# Patient Record
Sex: FEMALE | Race: WHITE | Employment: PART TIME | URBAN - METROPOLITAN AREA
[De-identification: names, ages, dates, MRNs, and addresses within clinical notes are randomized per-mention and may not be internally consistent; named-entity substitution may affect disease eponyms.]

---

## 2019-02-18 ENCOUNTER — APPOINTMENT (OUTPATIENT)
Dept: GENERAL RADIOLOGY | Age: 19
End: 2019-02-18
Payer: MEDICAID

## 2019-02-18 ENCOUNTER — HOSPITAL ENCOUNTER (EMERGENCY)
Age: 19
Discharge: HOME | End: 2019-02-18
Attending: EMERGENCY MEDICINE
Payer: MEDICAID

## 2019-02-18 VITALS
WEIGHT: 190 LBS | HEART RATE: 84 BPM | BODY MASS INDEX: 34.96 KG/M2 | SYSTOLIC BLOOD PRESSURE: 138 MMHG | RESPIRATION RATE: 18 BRPM | OXYGEN SATURATION: 97 % | HEIGHT: 62 IN | TEMPERATURE: 98.4 F | DIASTOLIC BLOOD PRESSURE: 68 MMHG

## 2019-02-18 DIAGNOSIS — S93.402A SPRAIN OF LEFT ANKLE, UNSPECIFIED LIGAMENT, INITIAL ENCOUNTER: Primary | ICD-10-CM

## 2019-02-18 PROCEDURE — 73610 X-RAY EXAM OF ANKLE: CPT

## 2019-02-18 PROCEDURE — 99283 EMERGENCY DEPT VISIT LOW MDM: CPT | Performed by: EMERGENCY MEDICINE

## 2019-02-18 NOTE — DISCHARGE INSTRUCTIONS
Follow up with one of the doctors listed. [address] [phone] [id number] 82  [address]  [address] [facility] [id number]  [phone]

## 2019-02-18 NOTE — LETTER
[address] [facility] EMERGENCY DEPT [address] [address] [phone] Work/School Note Date: 2/18/2019 To Whom It May concern: 
 
James Rivers was seen and treated today in the emergency room by the following provider(s): 
Attending Provider: Neha Garcia MD. James Rivers may return to work on 02/18/19.  
 
Sincerely, 
 
 
 
 
Hardik Roche RN

## 2019-02-18 NOTE — ED PROVIDER NOTES
Patient states she was coming down some steps four days ago she used when she twisted her left ankle. She had immediate pain to the lateral aspect of her ankle which swelled up later on. For the first two days she was unable to bear weight without a significant amount of pain. Today she has been able to walk on it with only a mild amount of discomfort. She took Motrin at couple times without any improvement in her symptoms. Elements of this note were created using speech recognition software. As such, errors of speech recognition may be present. No past medical history on file. No past surgical history on file. No family history on file. Social History Socioeconomic History  Marital status: SINGLE Spouse name: Not on file  Number of children: Not on file  Years of education: Not on file  Highest education level: Not on file Social Needs  Financial resource strain: Not on file  Food insecurity - worry: Not on file  Food insecurity - inability: Not on file  Transportation needs - medical: Not on file  Transportation needs - non-medical: Not on file Occupational History  Not on file Tobacco Use  Smoking status: Not on file Substance and Sexual Activity  Alcohol use: Not on file  Drug use: Not on file  Sexual activity: Not on file Other Topics Concern  Not on file Social History Narrative  Not on file ALLERGIES: Bactrim [sulfamethoprim] Review of Systems Constitutional: Negative for chills and fever. Gastrointestinal: Negative for nausea and vomiting. All other systems reviewed and are negative. Vitals:  
 02/18/19 0147 BP: 144/72 Pulse: (!) 115 Resp: 18 Temp: 98.3 °F (36.8 °C) SpO2: 95% Weight: 86.2 kg (190 lb) Height: 5' 2\" (1.575 m) Physical Exam  
Constitutional: She is oriented to person, place, and time. She appears well-developed and well-nourished.   
HENT:  
 Head: Normocephalic and atraumatic. Eyes: Conjunctivae are normal. Pupils are equal, round, and reactive to light. Neck: Normal range of motion. Neck supple. Musculoskeletal: She exhibits tenderness. She exhibits no edema. Tenderness to palpation left lateral malleolus Neurological: She is alert and oriented to person, place, and time. Skin: Skin is warm and dry. Psychiatric: She has a normal mood and affect. Her behavior is normal.  
Nursing note and vitals reviewed. MDM Number of Diagnoses or Management Options Sprain of left ankle, unspecified ligament, initial encounter: new and does not require workup Diagnosis management comments: 3:01 AM discussed normal x-rays with patient. She has no primary doctor, I will give her contact information Amount and/or Complexity of Data Reviewed Tests in the radiology section of CPT®: ordered and reviewed Risk of Complications, Morbidity, and/or Mortality Presenting problems: moderate Diagnostic procedures: moderate Management options: moderate Patient Progress Patient progress: stable Procedures

## 2019-02-18 NOTE — ED NOTES
I have reviewed discharge instructions with the patient. The patient verbalized understanding. Patient left ED via Discharge Method: ambulatory to Home with self. Opportunity for questions and clarification provided. Patient given 0 scripts. To continue your aftercare when you leave the hospital, you may receive an automated call from our care team to check in on how you are doing. This is a free service and part of our promise to provide the best care and service to meet your aftercare needs.  If you have questions, or wish to unsubscribe from this service please call [phone]. Thank you for Choosing our [facility] Emergency Department.

## 2019-02-18 NOTE — ED TRIAGE NOTES
Pt states 5 days ago she fell down steps and twisted her left ankle. Pt states she has had to call out of work due to the pain. Pt states she came to get a doctors note from work stating she is able to work.